# Patient Record
Sex: FEMALE | Race: WHITE | NOT HISPANIC OR LATINO | Employment: FULL TIME | URBAN - METROPOLITAN AREA
[De-identification: names, ages, dates, MRNs, and addresses within clinical notes are randomized per-mention and may not be internally consistent; named-entity substitution may affect disease eponyms.]

---

## 2018-11-15 ENCOUNTER — APPOINTMENT (OUTPATIENT)
Dept: WOMENS IMAGING | Facility: HOSPITAL | Age: 43
End: 2018-11-15

## 2018-11-15 PROCEDURE — 77067 SCR MAMMO BI INCL CAD: CPT | Performed by: RADIOLOGY

## 2018-11-15 PROCEDURE — 77063 BREAST TOMOSYNTHESIS BI: CPT | Performed by: RADIOLOGY

## 2020-01-08 ENCOUNTER — HOSPITAL ENCOUNTER (EMERGENCY)
Facility: HOSPITAL | Age: 45
Discharge: HOME | End: 2020-01-08
Admitting: EMERGENCY MEDICINE

## 2020-01-08 ENCOUNTER — APPOINTMENT (OUTPATIENT)
Dept: CT IMAGING | Facility: HOSPITAL | Age: 45
End: 2020-01-08

## 2020-01-08 VITALS
RESPIRATION RATE: 18 BRPM | HEIGHT: 64 IN | HEART RATE: 80 BPM | OXYGEN SATURATION: 99 % | BODY MASS INDEX: 36.7 KG/M2 | TEMPERATURE: 97.3 F | DIASTOLIC BLOOD PRESSURE: 94 MMHG | WEIGHT: 215 LBS | SYSTOLIC BLOOD PRESSURE: 141 MMHG

## 2020-01-08 DIAGNOSIS — R11.0 NAUSEA: ICD-10-CM

## 2020-01-08 DIAGNOSIS — R51.9 NONINTRACTABLE HEADACHE, UNSPECIFIED CHRONICITY PATTERN, UNSPECIFIED HEADACHE TYPE: Primary | ICD-10-CM

## 2020-01-08 LAB
ANION GAP SERPL CALCULATED.3IONS-SCNC: 13 MMOL/L (ref 5–15)
BASOPHILS # BLD AUTO: 0 10*3/MM3 (ref 0–0.2)
BASOPHILS NFR BLD AUTO: 0.6 % (ref 0–1.5)
BUN BLD-MCNC: 13 MG/DL (ref 6–20)
BUN/CREAT SERPL: 14.1 (ref 7–25)
CALCIUM SPEC-SCNC: 8.7 MG/DL (ref 8.6–10.5)
CHLORIDE SERPL-SCNC: 102 MMOL/L (ref 98–107)
CO2 SERPL-SCNC: 24 MMOL/L (ref 22–29)
CREAT BLD-MCNC: 0.92 MG/DL (ref 0.57–1)
DEPRECATED RDW RBC AUTO: 44.6 FL (ref 37–54)
EOSINOPHIL # BLD AUTO: 0.3 10*3/MM3 (ref 0–0.4)
EOSINOPHIL NFR BLD AUTO: 3.2 % (ref 0.3–6.2)
ERYTHROCYTE [DISTWIDTH] IN BLOOD BY AUTOMATED COUNT: 13.8 % (ref 12.3–15.4)
GFR SERPL CREATININE-BSD FRML MDRD: 66 ML/MIN/1.73
GLUCOSE BLD-MCNC: 132 MG/DL (ref 65–99)
HCT VFR BLD AUTO: 42.2 % (ref 34–46.6)
HGB BLD-MCNC: 14.3 G/DL (ref 12–15.9)
LYMPHOCYTES # BLD AUTO: 2.1 10*3/MM3 (ref 0.7–3.1)
LYMPHOCYTES NFR BLD AUTO: 23.5 % (ref 19.6–45.3)
MCH RBC QN AUTO: 31.4 PG (ref 26.6–33)
MCHC RBC AUTO-ENTMCNC: 33.9 G/DL (ref 31.5–35.7)
MCV RBC AUTO: 92.5 FL (ref 79–97)
MONOCYTES # BLD AUTO: 0.8 10*3/MM3 (ref 0.1–0.9)
MONOCYTES NFR BLD AUTO: 8.5 % (ref 5–12)
NEUTROPHILS # BLD AUTO: 5.7 10*3/MM3 (ref 1.7–7)
NEUTROPHILS NFR BLD AUTO: 64.2 % (ref 42.7–76)
NRBC BLD AUTO-RTO: 0.1 /100 WBC (ref 0–0.2)
PLATELET # BLD AUTO: 340 10*3/MM3 (ref 140–450)
PMV BLD AUTO: 7.7 FL (ref 6–12)
POTASSIUM BLD-SCNC: 4.1 MMOL/L (ref 3.5–5.2)
RBC # BLD AUTO: 4.57 10*6/MM3 (ref 3.77–5.28)
SODIUM BLD-SCNC: 139 MMOL/L (ref 136–145)
WBC NRBC COR # BLD: 8.9 10*3/MM3 (ref 3.4–10.8)

## 2020-01-08 PROCEDURE — 96375 TX/PRO/DX INJ NEW DRUG ADDON: CPT

## 2020-01-08 PROCEDURE — 80048 BASIC METABOLIC PNL TOTAL CA: CPT | Performed by: PHYSICIAN ASSISTANT

## 2020-01-08 PROCEDURE — 25010000002 KETOROLAC TROMETHAMINE PER 15 MG: Performed by: PHYSICIAN ASSISTANT

## 2020-01-08 PROCEDURE — 25010000002 PROCHLORPERAZINE 10 MG/2ML SOLUTION: Performed by: PHYSICIAN ASSISTANT

## 2020-01-08 PROCEDURE — 99283 EMERGENCY DEPT VISIT LOW MDM: CPT

## 2020-01-08 PROCEDURE — 25010000002 ONDANSETRON PER 1 MG: Performed by: PHYSICIAN ASSISTANT

## 2020-01-08 PROCEDURE — 96361 HYDRATE IV INFUSION ADD-ON: CPT

## 2020-01-08 PROCEDURE — 96374 THER/PROPH/DIAG INJ IV PUSH: CPT

## 2020-01-08 PROCEDURE — 85025 COMPLETE CBC W/AUTO DIFF WBC: CPT | Performed by: PHYSICIAN ASSISTANT

## 2020-01-08 PROCEDURE — 25010000002 DIPHENHYDRAMINE PER 50 MG: Performed by: PHYSICIAN ASSISTANT

## 2020-01-08 RX ORDER — DIPHENHYDRAMINE HYDROCHLORIDE 50 MG/ML
25 INJECTION INTRAMUSCULAR; INTRAVENOUS ONCE
Status: COMPLETED | OUTPATIENT
Start: 2020-01-08 | End: 2020-01-08

## 2020-01-08 RX ORDER — PROCHLORPERAZINE EDISYLATE 5 MG/ML
10 INJECTION INTRAMUSCULAR; INTRAVENOUS ONCE
Status: COMPLETED | OUTPATIENT
Start: 2020-01-08 | End: 2020-01-08

## 2020-01-08 RX ORDER — ONDANSETRON 2 MG/ML
4 INJECTION INTRAMUSCULAR; INTRAVENOUS ONCE
Status: COMPLETED | OUTPATIENT
Start: 2020-01-08 | End: 2020-01-08

## 2020-01-08 RX ORDER — ONDANSETRON 4 MG/1
4 TABLET, ORALLY DISINTEGRATING ORAL EVERY 8 HOURS PRN
Qty: 20 TABLET | Refills: 0 | Status: SHIPPED | OUTPATIENT
Start: 2020-01-08

## 2020-01-08 RX ORDER — SODIUM CHLORIDE 0.9 % (FLUSH) 0.9 %
10 SYRINGE (ML) INJECTION AS NEEDED
Status: DISCONTINUED | OUTPATIENT
Start: 2020-01-08 | End: 2020-01-08 | Stop reason: HOSPADM

## 2020-01-08 RX ORDER — KETOROLAC TROMETHAMINE 30 MG/ML
30 INJECTION, SOLUTION INTRAMUSCULAR; INTRAVENOUS ONCE
Status: COMPLETED | OUTPATIENT
Start: 2020-01-08 | End: 2020-01-08

## 2020-01-08 RX ADMIN — PROCHLORPERAZINE EDISYLATE 10 MG: 5 INJECTION INTRAMUSCULAR; INTRAVENOUS at 02:30

## 2020-01-08 RX ADMIN — SODIUM CHLORIDE 1000 ML: 0.9 INJECTION, SOLUTION INTRAVENOUS at 02:30

## 2020-01-08 RX ADMIN — KETOROLAC TROMETHAMINE 30 MG: 30 INJECTION, SOLUTION INTRAMUSCULAR at 02:30

## 2020-01-08 RX ADMIN — DIPHENHYDRAMINE HYDROCHLORIDE 25 MG: 50 INJECTION, SOLUTION INTRAMUSCULAR; INTRAVENOUS at 02:30

## 2020-01-08 RX ADMIN — ONDANSETRON 4 MG: 2 INJECTION INTRAMUSCULAR; INTRAVENOUS at 02:30

## 2020-01-08 NOTE — DISCHARGE INSTRUCTIONS
Continue take Excedrin or Tylenol as needed for headache.  Do not mix ibuprofen Advil, Aleve, Motrin, diclofenac or naproxen.  May take Zofran as needed for nausea.  Drink plenty of fluids.  Get plenty of rest.    Follow-up with primary care in the next 2 weeks for further evaluation and management as well as blood pressure check in the next 4 weeks.  Be sure to follow-up with primary care provider to refill your blood pressure medication.  Try to avoid taking medication that is not your own.  May follow-up with neurology if headaches persist or get worse.    Return to the ER for new or worsening symptoms, increased headache, nausea, vomiting, dizziness, blurry vision, chest pain, shortness of breath or fever.

## 2020-01-08 NOTE — ED PROVIDER NOTES
"Subjective   Patient 44-year-old  female with history of hypertension, migraines presents ER complaining of headache for 1 day.  Patient states headache started at 3 PM today and has persisted.  Patient reports taking 4 Excedrin Migraine pills with no relief.  Patient states the headache is mostly in the frontal and occipital region, does not radiate and rates it a 7/10.  Patient reports nausea and 3 episodes of diarrhea today.  Patient denies any vomiting, chest pain, abdominal pain, shortness of breath, dizziness or blurry vision.  Patient states that she normally takes losartan for high blood pressure, however her prescription  and states she started taking her 's lisinopril because \"I was on this before the losartan\".  Patient denies thunderclap onset, denies being worst headache of her life.  Patient denies any fall or injury.  Patient denies fever or myalgias.      History provided by:  Patient      Review of Systems   Constitutional: Negative for fever.   HENT: Negative for sore throat and trouble swallowing.    Respiratory: Negative for shortness of breath and wheezing.    Cardiovascular: Negative for chest pain.   Gastrointestinal: Positive for nausea and vomiting. Negative for abdominal pain.   Genitourinary: Negative for dysuria.   Musculoskeletal: Negative for myalgias.   Skin: Negative for rash.   Neurological: Positive for headaches.   Psychiatric/Behavioral: Negative for behavioral problems.   All other systems reviewed and are negative.      No past medical history on file.    Allergies   Allergen Reactions   • Bactrim [Sulfamethoxazole-Trimethoprim] Hives       No past surgical history on file.    No family history on file.    Social History     Socioeconomic History   • Marital status:      Spouse name: Not on file   • Number of children: Not on file   • Years of education: Not on file   • Highest education level: Not on file           Objective   Physical Exam " "  Constitutional: She is oriented to person, place, and time. She appears well-developed and well-nourished.  Non-toxic appearance. She does not appear ill.   HENT:   Head: Normocephalic and atraumatic.   Mouth/Throat: Oropharynx is clear and moist.   Eyes: Pupils are equal, round, and reactive to light. EOM are normal.   EOMs normal, no nystagmus, visual fields are full   Neck: Normal range of motion.   Cervical spine: No midline tenderness to palpation. No step-offs or deformities. Pain-free range of motion.  No meningismus   Cardiovascular: Normal rate, regular rhythm, normal heart sounds and intact distal pulses. Gallop: day.   No murmur heard.  Pulmonary/Chest: Effort normal and breath sounds normal. No respiratory distress. She has no wheezes.   Abdominal: Soft. Normal appearance and bowel sounds are normal. There is no CVA tenderness.   Lymphadenopathy:     She has no cervical adenopathy.   Neurological: She is alert and oriented to person, place, and time. No sensory deficit. Cranial nerve deficit: .vs.   Skin: Skin is warm and dry. Capillary refill takes less than 2 seconds. No rash noted.   Psychiatric: She has a normal mood and affect. Her behavior is normal.   Vitals reviewed.      Procedures           ED Course    /94 (BP Location: Left arm, Patient Position: Sitting)   Pulse 80   Temp 97.3 °F (36.3 °C) (Oral)   Resp 18   Ht 162.6 cm (64\")   Wt 97.5 kg (215 lb)   LMP  (LMP Unknown)   SpO2 99%   Breastfeeding No   BMI 36.90 kg/m²   Labs Reviewed   BASIC METABOLIC PANEL - Abnormal; Notable for the following components:       Result Value    Glucose 132 (*)     All other components within normal limits    Narrative:     GFR Normal >60  Chronic Kidney Disease <60  Kidney Failure <15     CBC WITH AUTO DIFFERENTIAL - Normal   CBC AND DIFFERENTIAL    Narrative:     The following orders were created for panel order CBC & Differential.  Procedure                               Abnormality         " Status                     ---------                               -----------         ------                     CBC Auto Differential[[id number]]        Normal              Final result                 Please view results for these tests on the individual orders.     Medications   sodium chloride 0.9 % flush 10 mL (has no administration in time range)   sodium chloride 0.9 % bolus 1,000 mL (has no administration in time range)   ketorolac (TORADOL) injection 30 mg (has no administration in time range)   prochlorperazine (COMPAZINE) injection 10 mg (has no administration in time range)   diphenhydrAMINE (BENADRYL) injection 25 mg (has no administration in time range)   ondansetron (ZOFRAN) injection 4 mg (has no administration in time range)     No radiology results for the last day                                             MDM  Number of Diagnoses or Management Options  Nausea:   Nonintractable headache, unspecified chronicity pattern, unspecified headache type:   Diagnosis management comments: MEDICAL DECISION  Epic Chart Review: Patient has had no recent ER visits, no previous admissions in the last 2 years.  Comorbidities: Hypertension, migraines  Differentials: Migraine, brain tumor, meningitis, intracranial hemorrhage, subarachnoid hemorrhage, sinus headache; this list is not all inclusive and does not constitute the entirety of considered causes  Radiology interpretation: CT of head not performed, patient refused  Lab interpretation:  Labs viewed by me significant for, BMP glucose 132.  CBC within normal limits.    Patient is seen and evaluated by myself in the emergency room.  Patient had peripheral IV placed, lab work obtained.  Patient was given 25 mg Benadryl, 10 mg Compazine and 30 mg of Toradol.  Also given 4 mg Zofran.  Patient reported significant improvement in her headache with these medications, rating it a 3/10.  Patient refused CT of head, stating that she did not want to have this done since  she does not have insurance.  Discussed possible poor outcomes including intracranial hemorrhage, bleeding, tumor that would be missed without having head CT done, patient still refuses to have the scan done.  Patient will be discharged with prescription for Zofran to take as needed for nausea.  Strongly encouraged follow-up with primary care for further evaluation and management as well as blood pressure check in the next 4 weeks.  Strongly encourage patient to not take medications that does not belong to her.  Patient verbalized understanding.    Discharge plan and instructions were discussed with the patient who verbalized understanding and is in agreement with the plan, all questions were answered at this time.  Patient is aware of signs symptoms that would require immediate return to the emergency room.  Patient understands importance of following up with primary care provider for further evaluation and worsening concerns as well as blood pressure recheck in the next 4 weeks.    Patient remained afebrile, nontoxic-appearing, no acute respiratory distress throughout entire emergency room stay.  Patient was discharged in improved stable condition with an upright steady gait.         Amount and/or Complexity of Data Reviewed  Clinical lab tests: reviewed and ordered    Patient Progress  Patient progress: improved      Final diagnoses:   Nonintractable headache, unspecified chronicity pattern, unspecified headache type   Nausea            Donita Gomes PA  01/08/20 [id number]

## 2020-01-08 NOTE — ED NOTES
"Pt asking if she has to have the CT scan, pt states \"the more I think about it I don't really want the CT since I don't have insurance right now and the medicine has already helped quite a bit\", NP notified, call light in reach, no distress noted, will continue to monitor     Georgiana Figueroa, RN  01/08/20 2305    "